# Patient Record
Sex: MALE | Race: WHITE | NOT HISPANIC OR LATINO | Employment: OTHER | URBAN - METROPOLITAN AREA
[De-identification: names, ages, dates, MRNs, and addresses within clinical notes are randomized per-mention and may not be internally consistent; named-entity substitution may affect disease eponyms.]

---

## 2019-06-20 ENCOUNTER — IMPORTED ENCOUNTER (OUTPATIENT)
Dept: URBAN - METROPOLITAN AREA CLINIC 9 | Facility: CLINIC | Age: 59
End: 2019-06-20

## 2019-09-12 ENCOUNTER — IMPORTED ENCOUNTER (OUTPATIENT)
Dept: URBAN - METROPOLITAN AREA CLINIC 9 | Facility: CLINIC | Age: 59
End: 2019-09-12

## 2019-09-24 ENCOUNTER — IMPORTED ENCOUNTER (OUTPATIENT)
Dept: URBAN - METROPOLITAN AREA CLINIC 9 | Facility: CLINIC | Age: 59
End: 2019-09-24

## 2019-10-03 ENCOUNTER — IMPORTED ENCOUNTER (OUTPATIENT)
Dept: URBAN - METROPOLITAN AREA CLINIC 9 | Facility: CLINIC | Age: 59
End: 2019-10-03

## 2019-10-08 ENCOUNTER — IMPORTED ENCOUNTER (OUTPATIENT)
Dept: URBAN - METROPOLITAN AREA CLINIC 9 | Facility: CLINIC | Age: 59
End: 2019-10-08

## 2019-11-21 ENCOUNTER — IMPORTED ENCOUNTER (OUTPATIENT)
Dept: URBAN - METROPOLITAN AREA CLINIC 9 | Facility: CLINIC | Age: 59
End: 2019-11-21

## 2020-09-08 NOTE — PATIENT DISCUSSION
Anatomically narrow angles with increased risk of appositional closure. Recommend LPI. Discussed risks, benefits, alternatives with patient who voices understanding and would like to proceed.

## 2020-09-21 NOTE — PROCEDURE NOTE: SURGICAL
Prior to commencing surgery, patient identification, surgical procedure, site, and side were confirmed by Dr. Rafiq Barrios. Following topical proparacaine anesthesia, the patient was positioned at the YAG laser, a contact lens coupled to the cornea of the right eye with methylcellulose and a peripheral iridotomy placed using 2.8 Mj E42 without complication. Attention was turned to the left eye and the patient was positioned at the YAG laser, a contact lens coupled to the cornea with methylcellulose and a peripheral iridotomy placed using 2.8 Mj x 22 without complication. Excess methylcellulose was washed from both eyes, one drop of Econopred Plus 1% instilled and the patient returned to the holding area having tolerated the procedure well and without complication. <br />[facility]:[id number]<IY /><br />

## 2021-04-15 ENCOUNTER — IMPORTED ENCOUNTER (OUTPATIENT)
Dept: URBAN - METROPOLITAN AREA CLINIC 9 | Facility: CLINIC | Age: 61
End: 2021-04-15

## 2021-06-17 ENCOUNTER — IMPORTED ENCOUNTER (OUTPATIENT)
Dept: URBAN - METROPOLITAN AREA CLINIC 9 | Facility: CLINIC | Age: 61
End: 2021-06-17

## 2021-07-23 ENCOUNTER — IMPORTED ENCOUNTER (OUTPATIENT)
Dept: URBAN - METROPOLITAN AREA CLINIC 9 | Facility: CLINIC | Age: 61
End: 2021-07-23

## 2021-10-16 ASSESSMENT — TONOMETRY
OD_IOP_MMHG: 12
OD_IOP_MMHG: 15
OS_IOP_MMHG: 14
OD_IOP_MMHG: 13
OD_IOP_MMHG: 14
OS_IOP_MMHG: 20
OD_IOP_MMHG: 21
OS_IOP_MMHG: 10
OD_IOP_MMHG: 10
OS_IOP_MMHG: 20
OS_IOP_MMHG: 13
OD_IOP_MMHG: 17
OS_IOP_MMHG: 13
OS_IOP_MMHG: 14
OS_IOP_MMHG: 14
OS_IOP_MMHG: 18
OD_IOP_MMHG: 15

## 2021-10-16 ASSESSMENT — KERATOMETRY
OS_AXISANGLE_DEGREES: 132
OD_K2POWER_DIOPTERS: 42.75
OS_AXISANGLE2_DEGREES: 22
OD_K1POWER_DIOPTERS: 42
OS_AXISANGLE_DEGREES: 180
OS_AXISANGLE2_DEGREES: 42
OS_AXISANGLE2_DEGREES: 56
OD_AXISANGLE2_DEGREES: 3
OS_K2POWER_DIOPTERS: 42.75
OS_K2POWER_DIOPTERS: 42.5
OD_AXISANGLE2_DEGREES: 3
OD_AXISANGLE2_DEGREES: 5
OS_AXISANGLE_DEGREES: 146
OS_K2POWER_DIOPTERS: 42.5
OD_K1POWER_DIOPTERS: 42.25
OD_AXISANGLE_DEGREES: 93
OS_K1POWER_DIOPTERS: 42.25
OD_AXISANGLE_DEGREES: 94
OD_K1POWER_DIOPTERS: 42.5
OD_AXISANGLE2_DEGREES: 4
OS_K1POWER_DIOPTERS: 42.25
OS_K1POWER_DIOPTERS: 42.25
OS_AXISANGLE2_DEGREES: 90
OS_K1POWER_DIOPTERS: 42.25
OS_K1POWER_DIOPTERS: 42.5
OD_AXISANGLE_DEGREES: 93
OD_K1POWER_DIOPTERS: 41.75
OD_AXISANGLE_DEGREES: 95
OS_AXISANGLE_DEGREES: 115
OD_AXISANGLE2_DEGREES: 180
OD_K2POWER_DIOPTERS: 42.75
OD_K1POWER_DIOPTERS: 41.75
OS_K2POWER_DIOPTERS: 42.5
OS_K2POWER_DIOPTERS: 42.75
OS_AXISANGLE2_DEGREES: 25
OD_K2POWER_DIOPTERS: 42.75
OD_K2POWER_DIOPTERS: 43
OD_AXISANGLE_DEGREES: 90
OD_K2POWER_DIOPTERS: 43
OS_AXISANGLE_DEGREES: 112

## 2021-10-16 ASSESSMENT — VISUAL ACUITY
OS_SC: 20/100 - SN
OD_CC: 20/20 SN
OS_PH: 20/25 SN
OD_CC: 20/20 SN
OD_CC: 20/40 SN
OD_CC: 20/40 SN
OD_CC: 20/25 + SN
OS_SC: 20/20 SN
OD_CC: 20/20 SN
OS_CC: 20/20 -2 SN
OS_SC: 20/100 SN
OS_CC: 20/20 SN
OD_CC: 20/20 SN
OD_SC: 20/25 SN
OS_SC: 20/70 SN
OS_CC: 20/20 - SN
OS_SC: 20/25 + SN
OD_CC: 20/25 SN
OS_PH: 20/20 SN
OD_CC: 20/25 SN
OD_SC: 20/80 SN
OD_SC: 20/20 SN
OS_CC: 20/20 - SN
OS_SC: 20/20 SN
OD_SC: 20/25 + SN
OS_CC: 20/20 SN
OS_CC: 20/20 SN
OD_PH: 20/25 SN
OS_CC: 20/20 SN
OD_CC: 20/25 SN
OD_SC: 20/30 -2 SN
OS_CC: 20/20 SN
OD_CC: 20/20 SN
OS_CC: 20/20 -2 SN

## 2022-02-04 ENCOUNTER — ESTABLISHED PATIENT (OUTPATIENT)
Dept: URBAN - METROPOLITAN AREA CLINIC 17 | Facility: CLINIC | Age: 62
End: 2022-02-04

## 2022-02-04 DIAGNOSIS — H43.393: ICD-10-CM

## 2022-02-04 DIAGNOSIS — H43.02: ICD-10-CM

## 2022-02-04 DIAGNOSIS — H43.813: ICD-10-CM

## 2022-02-04 PROCEDURE — 92134 CPTRZ OPH DX IMG PST SGM RTA: CPT

## 2022-02-04 PROCEDURE — 92014 COMPRE OPH EXAM EST PT 1/>: CPT

## 2022-02-04 ASSESSMENT — VISUAL ACUITY
OS_CC: 20/25
OD_CC: 20/25

## 2022-02-04 ASSESSMENT — TONOMETRY
OS_IOP_MMHG: 14
OD_IOP_MMHG: 16

## 2022-03-02 ENCOUNTER — POST-OP (OUTPATIENT)
Dept: URBAN - METROPOLITAN AREA CLINIC 9 | Facility: CLINIC | Age: 62
End: 2022-03-02

## 2022-03-02 DIAGNOSIS — H43.02: ICD-10-CM

## 2022-03-02 PROCEDURE — 99024 POSTOP FOLLOW-UP VISIT: CPT

## 2022-03-02 ASSESSMENT — VISUAL ACUITY
OD_SC: 20/20-2
OS_SC: 20/25+2

## 2022-03-02 ASSESSMENT — TONOMETRY
OD_IOP_MMHG: 14
OS_IOP_MMHG: 11

## 2022-03-09 ENCOUNTER — POST-OP (OUTPATIENT)
Dept: URBAN - METROPOLITAN AREA CLINIC 9 | Facility: CLINIC | Age: 62
End: 2022-03-09

## 2022-03-09 DIAGNOSIS — H43.393: ICD-10-CM

## 2022-03-09 DIAGNOSIS — H43.02: ICD-10-CM

## 2022-03-09 PROCEDURE — 99024 POSTOP FOLLOW-UP VISIT: CPT

## 2022-03-09 ASSESSMENT — VISUAL ACUITY
OS_SC: 20/25-1
OD_SC: 20/30-1

## 2022-03-09 ASSESSMENT — TONOMETRY
OS_IOP_MMHG: 12
OD_IOP_MMHG: 13

## 2022-04-13 ENCOUNTER — POST-OP (OUTPATIENT)
Dept: URBAN - METROPOLITAN AREA CLINIC 9 | Facility: CLINIC | Age: 62
End: 2022-04-13

## 2022-04-13 DIAGNOSIS — H43.02: ICD-10-CM

## 2022-04-13 PROCEDURE — 99024 POSTOP FOLLOW-UP VISIT: CPT

## 2022-04-13 ASSESSMENT — VISUAL ACUITY
OS_CC: 20/20-1
OD_CC: 20/20-1

## 2022-04-13 ASSESSMENT — TONOMETRY
OS_IOP_MMHG: 14
OD_IOP_MMHG: 13

## 2022-07-07 RX ORDER — MELOXICAM 15 MG/1
TABLET ORAL
COMMUNITY
Start: 2018-03-27 | End: 2023-03-09